# Patient Record
Sex: MALE | Race: WHITE | NOT HISPANIC OR LATINO | Employment: OTHER | URBAN - METROPOLITAN AREA
[De-identification: names, ages, dates, MRNs, and addresses within clinical notes are randomized per-mention and may not be internally consistent; named-entity substitution may affect disease eponyms.]

---

## 2017-01-04 DIAGNOSIS — Z79.899 ENCOUNTER FOR MONITORING DIURETIC THERAPY: Primary | ICD-10-CM

## 2017-01-04 DIAGNOSIS — Z51.81 ENCOUNTER FOR MONITORING DIURETIC THERAPY: Primary | ICD-10-CM

## 2017-01-04 RX ORDER — POTASSIUM CHLORIDE 750 MG/1
10 TABLET, FILM COATED, EXTENDED RELEASE ORAL DAILY
Qty: 30 TABLET | Refills: 5 | Status: CANCELLED | OUTPATIENT
Start: 2017-01-04

## 2017-01-04 NOTE — TELEPHONE ENCOUNTER
I put through an order for a BMP-he needs to get this done first so we can see which dose of potassium he should be on going forward.

## 2017-01-04 NOTE — TELEPHONE ENCOUNTER
Informed pt's wife, she verbalized understanding and will instruct pt to go to the outpatient lab to have the lab work.      Tracey, I'm sending to you just in case you get results or if pt calls back. The rx is pending.

## 2017-01-04 NOTE — TELEPHONE ENCOUNTER
When pt was in the hosp, he was d/c to take Klor Con 20meq qd, but was given 10meq caps.  Pt was under the impression that he was to only take cap once daily instead of 2 caps (20 meq) daily.  Pt requesting refill, does pt need to take it one or two caps (a 20meq cap)?

## 2017-01-17 ENCOUNTER — LAB (OUTPATIENT)
Dept: LAB | Facility: HOSPITAL | Age: 82
End: 2017-01-17

## 2017-01-17 DIAGNOSIS — Z51.81 ENCOUNTER FOR MONITORING DIURETIC THERAPY: ICD-10-CM

## 2017-01-17 DIAGNOSIS — Z79.899 ENCOUNTER FOR MONITORING DIURETIC THERAPY: ICD-10-CM

## 2017-01-17 LAB
ANION GAP SERPL CALCULATED.3IONS-SCNC: 13.4 MMOL/L
BUN BLD-MCNC: 26 MG/DL (ref 8–23)
BUN/CREAT SERPL: 15.9 (ref 7–25)
CALCIUM SPEC-SCNC: 9.2 MG/DL (ref 8.6–10.5)
CHLORIDE SERPL-SCNC: 100 MMOL/L (ref 98–107)
CO2 SERPL-SCNC: 30.6 MMOL/L (ref 22–29)
CREAT BLD-MCNC: 1.64 MG/DL (ref 0.76–1.27)
GFR SERPL CREATININE-BSD FRML MDRD: 40 ML/MIN/1.73
GLUCOSE BLD-MCNC: 97 MG/DL (ref 65–99)
POTASSIUM BLD-SCNC: 4.5 MMOL/L (ref 3.5–5.2)
SODIUM BLD-SCNC: 144 MMOL/L (ref 136–145)

## 2017-01-17 PROCEDURE — 80048 BASIC METABOLIC PNL TOTAL CA: CPT

## 2017-01-17 PROCEDURE — 36415 COLL VENOUS BLD VENIPUNCTURE: CPT

## 2017-01-20 ENCOUNTER — OFFICE VISIT (OUTPATIENT)
Dept: CARDIOLOGY | Facility: CLINIC | Age: 82
End: 2017-01-20

## 2017-01-20 VITALS
HEART RATE: 147 BPM | WEIGHT: 178 LBS | SYSTOLIC BLOOD PRESSURE: 138 MMHG | DIASTOLIC BLOOD PRESSURE: 84 MMHG | HEIGHT: 64 IN | BODY MASS INDEX: 30.39 KG/M2

## 2017-01-20 DIAGNOSIS — I50.22 CHRONIC SYSTOLIC HEART FAILURE (HCC): ICD-10-CM

## 2017-01-20 DIAGNOSIS — I25.10 CORONARY ARTERY DISEASE INVOLVING NATIVE CORONARY ARTERY OF NATIVE HEART WITHOUT ANGINA PECTORIS: Primary | ICD-10-CM

## 2017-01-20 DIAGNOSIS — I48.19 PERSISTENT ATRIAL FIBRILLATION (HCC): ICD-10-CM

## 2017-01-20 DIAGNOSIS — Z95.5 S/P CORONARY ARTERY STENT PLACEMENT: ICD-10-CM

## 2017-01-20 PROCEDURE — 93000 ELECTROCARDIOGRAM COMPLETE: CPT | Performed by: INTERNAL MEDICINE

## 2017-01-20 PROCEDURE — 99214 OFFICE O/P EST MOD 30 MIN: CPT | Performed by: INTERNAL MEDICINE

## 2017-01-20 NOTE — MR AVS SNAPSHOT
Mac BEASLEY Tcemelia   1/20/2017 2:15 PM   Office Visit    Dept Phone:  [phone]   Encounter #:  [id number]    Provider:  Tim Molina III, MD   Department:  [facility] CARDIOLOGY                Your Full Care Plan              Your Updated Medication List          This list is accurate as of: 1/20/17  2:33 PM.  Always use your most recent med list.                amLODIPine 10 MG tablet   Commonly known as:  NORVASC       atorvastatin 10 MG tablet   Commonly known as:  LIPITOR       bumetanide 2 MG tablet   Commonly known as:  BUMEX       carvedilol 3.125 MG tablet   Commonly known as:  COREG       clonazePAM 1 MG tablet   Commonly known as:  KlonoPIN       CloNIDine 0.3 MG tablet   Commonly known as:  CATAPRES       clopidogrel 75 MG tablet   Commonly known as:  PLAVIX       escitalopram 10 MG tablet   Commonly known as:  LEXAPRO       KLOR-CON 10 MEQ CR tablet   Generic drug:  potassium chloride   TAKE 1 TABLET EVERY DAY       MULTIVITAMIN ADULT PO       tamsulosin 0.4 MG capsule 24 hr capsule   Commonly known as:  FLOMAX               We Performed the Following     ECG 12 Lead       You Were Diagnosed With        Codes Comments    Coronary artery disease involving native coronary artery of native heart without angina pectoris    -  Primary ICD-10-CM: I25.10  ICD-9-CM: 414.01     Persistent atrial fibrillation     ICD-10-CM: I48.1  ICD-9-CM: 427.31     S/P coronary artery stent placement     ICD-10-CM: Z95.5  ICD-9-CM: V45.82     Chronic systolic heart failure     ICD-10-CM: I50.22  ICD-9-CM: 428.22       Instructions    Heart Disease Prevention  Heart disease is a leading cause of death. There are many things you can do to help prevent heart disease.  BE PHYSICALLY ACTIVE  Physical activity is good for your heart. It helps control your blood pressure, cholesterol levels, and weight. Try to be physically active every day. Ask your health care provider  what activities are best for you.   BE A HEALTHY WEIGHT  Extra weight can strain your heart and affect your blood pressure and cholesterol levels. Lose weight with diet and exercise if recommended by your health care provider.  EAT HEART-HEALTHY FOODS  Follow a healthy eating plan as recommended by your health care provider or dietitian. Heart-healthy foods include:   · High-fiber foods. These include oat bran, oatmeal, and whole-grain breads and cereals.  · Fruits and vegetables.  Avoid:  · Alcohol.  · Fried foods.  · Foods high in saturated fat. These include meats, butter, whole dairy products, shortening, and coconut or palm oil.  · Salty foods. These include canned food, luncheon meat, salty snacks, and fast food.  KEEP YOUR CHOLESTEROL LEVELS UNDER CONTROL  Cholesterol is a substance that is used for many important functions. When your cholesterol levels are high, cholesterol can stick to the insides of your blood vessels, making them narrow or clog. This can lead to chest pain (angina) and a heart attack.   Keep your cholesterol levels under control as recommended by your health care provider. Have your cholesterol checked at least once a year. Target cholesterol levels (in mg/dL) for most people are:   · Total cholesterol below 200.  · LDL cholesterol below 100.  · HDL cholesterol above 40 in men and above 50 in women.  · Triglycerides below 150.  KEEP YOUR BLOOD PRESSURE UNDER CONTROL  Having high blood pressure (hypertension) puts you at risk for stroke and other forms of heart disease. Keep your blood pressure under control as recommended by your health care provider. Ask your health care provider if you need treatment to lower your blood pressure. If you are 18-39 years of age, have your blood pressure checked every 3-5 years. If you are 40 years of age or older, have your blood pressure checked every year.  DO NOT USE TOBACCO PRODUCTS  Tobacco smoke can damage your heart and blood vessels. Do not use  any tobacco products including cigarettes, chewing tobacco, or electronic cigarettes. If you need help quitting, ask your health care provider.  TAKE MEDICINES AS DIRECTED  Take medicines only as directed by your health care provider. Ask your health care provider whether you should take an aspirin every day. Taking aspirin can help reduce your risk of heart disease and stroke.   FOR MORE INFORMATION   To find out more about heart disease, visit the American Heart Association's website at www.americanheart.org     This information is not intended to replace advice given to you by your health care provider. Make sure you discuss any questions you have with your health care provider.     Document Released: 2005 Document Revised: 2016 Document Reviewed: 2015  [employer] Interactive Patient Education  [employer]       Patient Instructions History      Upcoming Appointments     Visit Type Date Time Department    FOLLOW UP 2017  2:15 PM KAYLEY WAGNER      [employer] Signup     [employer] allows you to send messages to your doctor, view your test results, renew your prescriptions, schedule appointments, and more. To sign up, go to [facility] and click on the Sign Up Now link in the New User? box. Enter your [employer] Activation Code exactly as it appears below along with the last four digits of your Social Security Number and your Date of Birth () to complete the sign-up process. If you do not sign up before the expiration date, you must request a new code.    [employer] Activation Code: [email or web address]  Expires: 2/3/2017  2:33 PM    If you have questions, you can email [email or web address] or call [phone] to talk to our [employer] staff. Remember, [employer] is NOT to be used for urgent needs. For medical emergencies, dial 911.               Other Info from Your Visit           Allergies     No Known Allergies      Reason for Visit     Coronary Artery  "Disease           Vital Signs     Blood Pressure Pulse Height Weight Body Mass Index Smoking Status    138/84 147 64\" (162.6 cm) 178 lb (80.7 kg) 30.55 kg/m2 Former Smoker      Problems and Diagnoses Noted     Heart failure    Coronary artery disease involving native coronary artery of native heart without angina pectoris    Atrial fibrillation (irregular heartbeat)    Status post coronary artery stent placement      No Longer an Issue     Unstable angina        "

## 2017-01-20 NOTE — PROGRESS NOTES
Date of Office Visit: 2017  Encounter Provider: Tim Molina III, MD  Place of Service: [facility] CARDIOLOGY  Patient Name: Mac Gan  :1932    Chief Complaint   Patient presents with   • Coronary Artery Disease   :     Dear Dr. Childs,    HPI: Mac Gan is a 85 y.o. male who presents today for follow-up of his cardiac status.      It has been about 6 months since his last visit.  He's been doing well without any complaints of chest pain, pressure, tightness, squeezing, or heartburn.  He denies any nausea or vomiting.  He tells me that his iron level is returned to normal and is been no other evidence of GI bleeding.  As you know, because of his GI bleed he was felt not to be a candidate for chronic anticoagulation with his atrial fibrillation.  His been maintained on Plavix alone, although now he can start aspirin after he is off the Plavix.  No lower extremity edema, no orthopnea or PND.      In 2015, he presented with  CHF and was found to havef atrial fibrillation with RVR, chronic renal insufficiency systolic heart failure with a ejection fraction recorded at 44% in 2015. It appeared he had underlying CAD but due to his renal failure, a cath could not be performed at that time.   On  the patient underwent a PCI to the ostial LAD, PCI to the mid and distal LAD and a PCI to the OM, all with Xience drug-eluting stents.  He was seen in office on , for post PCI visit, in our office and was doing well at that time.  He was exercising and had recovered from his procedure.  His Xarelto was restarted in addition to his Plavix at that time as the patient was still in atrial fibrillation.  His aspirin was stopped at that time.  A couple weeks after change in medication the patient experienced a large epistaxis event.  On 2016, his hemoglobin was recorded at 7.8.  On 2016 his hemoglobin was recorded at 7.7.   Around April 20 the patient started experiencing dizziness and his hydralazine was stopped.        Past Medical History   Diagnosis Date   • Atrial fibrillation    • Bronchospastic airway disease    • Cardiomyopathy    • CHF (congestive heart failure)    • Coronary artery disease    • Diabetes mellitus    • DM (diabetes mellitus)    • Hyperkalemia    • Hypertension    • Obesity    • Renal insufficiency syndrome    • Shortness of breath        Past Surgical History   Procedure Laterality Date   • Pr rt/lt heart catheters N/A 3/16/2016     Procedure: Percutaneous Coronary Intervention LAD and LCX;  Surgeon: Alvarez Gallo MD;  Location: [facility] CATH INVASIVE LOCATION;  Service: Cardiovascular   • Coronary stent placement     • Colonoscopy N/A 5/8/2016     Procedure: COLONOSCOPY AND EGD;  Surgeon: Julita Tam MD;  Location: [facility] ENDOSCOPY;  Service:    • Endoscopy N/A 5/8/2016     Procedure: ESOPHAGOGASTRODUODENOSCOPY;  Surgeon: Julita Tam MD;  Location: [facility] ENDOSCOPY;  Service:            Review of Systems   Constitution: Positive for malaise/fatigue. Negative for chills, fever, weight gain and weight loss.   HENT: Negative for ear pain, headaches, hearing loss, nosebleeds and sore throat.    Eyes: Negative for double vision, pain and visual disturbance.   Cardiovascular: Positive for near-syncope. Negative for chest pain, dyspnea on exertion, irregular heartbeat, leg swelling, orthopnea, palpitations, paroxysmal nocturnal dyspnea and syncope.   Respiratory: Positive for shortness of breath. Negative for cough, sleep disturbances due to breathing, snoring and wheezing.    Endocrine: Negative for cold intolerance, heat intolerance and polyuria.   Skin: Negative for itching and rash.   Musculoskeletal: Negative for joint pain, joint swelling and myalgias.   Gastrointestinal: Negative for abdominal pain, diarrhea, melena, nausea and vomiting.   Genitourinary: Negative for frequency, hematuria and  "hesitancy.   Neurological: Negative for excessive daytime sleepiness, light-headedness, numbness, paresthesias and seizures.   Psychiatric/Behavioral: Negative for altered mental status and depression.   Allergic/Immunologic: Negative.    All other systems reviewed and are negative.    All other systems reviewed and are negative    No Known Allergies    All aspects of family and social history reviewed.          Objective:     Vitals:    01/20/17 1413   BP: 138/84   Pulse: (!) 147   Weight: 178 lb (80.7 kg)   Height: 64\" (162.6 cm)     Body mass index is 30.55 kg/(m^2).    PHYSICAL EXAM:  Physical Exam   Constitutional: He is oriented to person, place, and time. He appears well-developed and well-nourished. No distress.   HENT:   Head: Normocephalic and atraumatic.   Eyes: Pupils are equal, round, and reactive to light. Scleral icterus: pale sclera.   Neck: No JVD present. No thyromegaly present.   Cardiovascular: Normal rate, normal heart sounds and intact distal pulses.  An irregular rhythm present.   No murmur heard.  Pulses:       Radial pulses are 2+ on the right side, and 2+ on the left side.   Pulmonary/Chest: Effort normal and breath sounds normal. No respiratory distress.   Abdominal: Soft. He exhibits no distension. There is no tenderness.   Musculoskeletal: Normal range of motion. He exhibits no edema.   Neurological: He is alert and oriented to person, place, and time.   Skin: Skin is warm and dry. He is not diaphoretic. No erythema.   Psychiatric: He has a normal mood and affect. His behavior is normal. Judgment normal.         ECG 12 Lead  Date/Time: 1/20/2017 2:32 PM  Performed by: JUANJO LEGER III.  Authorized by: JUANJO LEGER III   Comparison: compared with previous ECG   Similar to previous ECG  Rhythm: atrial fibrillation  Rate: normal  Conduction: right bundle branch block  ST Segments: ST segments normal  T flattening: all  QRS axis: left  Other: no other findings  Clinical impression: " abnormal ECG                Assessment:       Diagnosis Plan   1. Coronary artery disease involving native coronary artery of native heart without angina pectoris     2. Persistent atrial fibrillation     3. S/P coronary artery stent placement     4. Chronic systolic heart failure              Current Outpatient Prescriptions   Medication Sig Dispense Refill   • amLODIPine (NORVASC) 10 MG tablet Take 1 tablet by mouth 2 (two) times a day.     • atorvastatin (LIPITOR) 10 MG tablet Take 1 tablet by mouth every night.     • bumetanide (BUMEX) 2 MG tablet Take 1 tablet by mouth daily.     • carvedilol (COREG) 3.125 MG tablet Take 1 tablet by mouth 2 (two) times a day with meals. 180 tablet 3   • clonazePAM (KlonoPIN) 1 MG tablet Take 1 tablet by mouth every night.     • cloNIDine (CATAPRES) 0.3 MG tablet Take 1 tablet by mouth 2 (two) times a day.     • clopidogrel (PLAVIX) 75 MG tablet Take 75 mg by mouth daily.  12   • escitalopram (LEXAPRO) 10 MG tablet Take 1 tablet by mouth daily.     • KLOR-CON 10 MEQ CR tablet TAKE 1 TABLET EVERY DAY 30 tablet 5   • Multiple Vitamins-Minerals (MULTIVITAMIN ADULT PO) Take  by mouth daily.     • tamsulosin (FLOMAX) 0.4 MG capsule 24 hr capsule Take 1 capsule by mouth daily.       No current facility-administered medications for this visit.             Plan:           1. Anemia/GI bleed-much improved.  He is not having any further bleeding.  His iron deficiency has resolved.  He is not a candidate for anticoagulation at this point.  He is doing well with the Plavix, and that will be stopped in March.  He will then start 81 mg aspirin daily.    2. Atrial Fib-rate is controlled.  His heart rate is actually a little bit on the low side, so we will decrease his carvedilol    3.  Renal insufficiency.  Improved/stable    4. Chronic systolic heart failure-no evidence of volume overload at this time.    5 . CAD s/p PCI with drug eluting stents-on Plavix till March.  After that he will have  the lesion in his nose biopsied.

## 2017-04-07 RX ORDER — POTASSIUM CHLORIDE 750 MG/1
TABLET, FILM COATED, EXTENDED RELEASE ORAL
Qty: 30 TABLET | Refills: 5 | Status: SHIPPED | OUTPATIENT
Start: 2017-04-07 | End: 2017-10-26 | Stop reason: SDUPTHER

## 2017-10-26 RX ORDER — POTASSIUM CHLORIDE 750 MG/1
TABLET, FILM COATED, EXTENDED RELEASE ORAL
Qty: 30 TABLET | Refills: 5 | Status: SHIPPED | OUTPATIENT
Start: 2017-10-26